# Patient Record
Sex: FEMALE | Race: WHITE | Employment: FULL TIME | URBAN - METROPOLITAN AREA
[De-identification: names, ages, dates, MRNs, and addresses within clinical notes are randomized per-mention and may not be internally consistent; named-entity substitution may affect disease eponyms.]

---

## 2020-09-21 ENCOUNTER — OFFICE VISIT (OUTPATIENT)
Dept: ORTHOPEDICS | Facility: CLINIC | Age: 45
End: 2020-09-21
Payer: COMMERCIAL

## 2020-09-21 VITALS
SYSTOLIC BLOOD PRESSURE: 139 MMHG | WEIGHT: 250 LBS | HEART RATE: 93 BPM | DIASTOLIC BLOOD PRESSURE: 75 MMHG | HEIGHT: 61 IN | OXYGEN SATURATION: 95 % | BODY MASS INDEX: 47.2 KG/M2

## 2020-09-21 DIAGNOSIS — G56.03 BILATERAL CARPAL TUNNEL SYNDROME: Primary | ICD-10-CM

## 2020-09-21 PROCEDURE — 99203 OFFICE O/P NEW LOW 30 MIN: CPT | Mod: 25 | Performed by: ORTHOPAEDIC SURGERY

## 2020-09-21 PROCEDURE — 20526 THER INJECTION CARP TUNNEL: CPT | Mod: 50 | Performed by: ORTHOPAEDIC SURGERY

## 2020-09-21 RX ORDER — MECLIZINE HYDROCHLORIDE 25 MG/1
TABLET ORAL
COMMUNITY
Start: 2020-07-30

## 2020-09-21 RX ORDER — POLYMYXIN B SULFATE AND TRIMETHOPRIM 1; 10000 MG/ML; [USP'U]/ML
1 SOLUTION OPHTHALMIC
COMMUNITY
Start: 2020-09-01

## 2020-09-21 RX ORDER — TRIAMCINOLONE ACETONIDE 40 MG/ML
40 INJECTION, SUSPENSION INTRA-ARTICULAR; INTRAMUSCULAR
Status: SHIPPED | OUTPATIENT
Start: 2020-09-21

## 2020-09-21 RX ORDER — ALBUTEROL SULFATE 90 UG/1
2 POWDER, METERED RESPIRATORY (INHALATION)
COMMUNITY
Start: 2020-03-12

## 2020-09-21 RX ORDER — ALBUTEROL SULFATE 90 UG/1
AEROSOL, METERED RESPIRATORY (INHALATION)
COMMUNITY
Start: 2020-03-12

## 2020-09-21 RX ORDER — PROPRANOLOL HYDROCHLORIDE 10 MG/1
10 TABLET ORAL
COMMUNITY
Start: 2020-07-28

## 2020-09-21 RX ORDER — GABAPENTIN 300 MG/1
CAPSULE ORAL
COMMUNITY
Start: 2020-05-14

## 2020-09-21 RX ORDER — TRIAMCINOLONE ACETONIDE 0.1 %
PASTE (GRAM) DENTAL
COMMUNITY
Start: 2019-07-09

## 2020-09-21 RX ORDER — IBUPROFEN 800 MG/1
800 TABLET, FILM COATED ORAL
COMMUNITY

## 2020-09-21 RX ORDER — CLONAZEPAM 0.5 MG/1
TABLET ORAL
COMMUNITY
Start: 2019-03-05

## 2020-09-21 RX ORDER — LISINOPRIL AND HYDROCHLOROTHIAZIDE 12.5; 2 MG/1; MG/1
1 TABLET ORAL
COMMUNITY
Start: 2020-07-15

## 2020-09-21 RX ORDER — BUPIVACAINE HYDROCHLORIDE 2.5 MG/ML
3 INJECTION, SOLUTION INFILTRATION; PERINEURAL
Status: SHIPPED | OUTPATIENT
Start: 2020-09-21

## 2020-09-21 RX ADMIN — TRIAMCINOLONE ACETONIDE 40 MG: 40 INJECTION, SUSPENSION INTRA-ARTICULAR; INTRAMUSCULAR at 16:57

## 2020-09-21 RX ADMIN — BUPIVACAINE HYDROCHLORIDE 3 ML: 2.5 INJECTION, SOLUTION INFILTRATION; PERINEURAL at 16:57

## 2020-09-21 ASSESSMENT — MIFFLIN-ST. JEOR: SCORE: 1721.37

## 2020-09-21 NOTE — PROGRESS NOTES
CHIEF COMPLAINT:   Chief Complaint   Patient presents with     Right Wrist - Pain     Left Wrist - Pain     Consult     bilateral carpal tunnel syndrome         HISTORY:  Rosalva Oconnor is a 45 year old female , Right -hand dominant who is seen in for Bilateral hand numbness and tingling and pain x 4-5 years. Right maybe a little worse than the left. The symptoms have been constant, and helped early on by night braces but not lately.   Has not tried a NSAIDs, physical therapy and injections.  she has numbness and tingling in all digits.  Other symptoms include pain up arm. Locats pain in thumbs, palm. Reports decreased left forearm rotation (supination/pronation), unrelated, for some reason.     Suspected cause: Due to work activities ()  Pain severity: 4/10  Pain quality: dull and aching  Frequency of symptoms: are constant.  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  Previous modalities tried: a splint  Prior wrist injury/trauma: denies    Usual level of work activity: .    Other PMH:  has no past medical history on file.  There is no problem list on file for this patient.      Surgical Hx:  has no past surgical history on file.    Medications: No current outpatient medications on file.    Allergies: Allergies not on file    Social Hx: .  reports that she has never smoked. She has never used smokeless tobacco. She reports previous alcohol use. She reports that she does not use drugs.    Family Hx: family history is not on file.. Negative for bleeding/clotting disorders. Negative for adverse anesthesia reactions.    REVIEW OF SYSTEMS: 10 point ROS neg other than the symptoms noted above in the HPI and PMH. Notables include  CONSTITUTIONAL:NEGATIVE for fever, chills, change in weight  INTEGUMENTARY/SKIN: NEGATIVE for worrisome rashes, moles or lesions  MUSCULOSKELETAL:See HPI above  Neurology: see HPI above.      EXAM:  /75 (BP Location: Right  "arm, Patient Position: Sitting, Cuff Size: Adult Regular)   Pulse 93   Ht 1.549 m (5' 1\")   Wt 113.4 kg (250 lb)   SpO2 95%   BMI 47.24 kg/m    GENERAL APPEARANCE: healthy, alert and no distress   GAIT: NORMAL  SKIN: no suspicious lesions or rashes  RESPIRATORY: No increased work of breathing.  NEURO:     strength: decreased,    thenar fasiculations: negative    Thenar atrophy:Mild.    Sensation slight decreased in all digits,    reflexes normal in upper extremities.   PSYCH:  mentation appears normal and affect normal, not anxious.    MUSCULOSKELETAL:    RIGHT HAND/FINGERS:    Skin intact. No abnormal skin discoloration, erythema or ecchymosis.   No nail pitting or clubbing.  Normal wear pattern, color and tone.  No observable or palpable masses of the fingers or palm or wrist.  No palpable triggering of fingers.   No observable or palpable cords or nodules of the fingers or palm.    There is no swelling in the wrist, hand, forearm.  There is mild tenderness in the wrist, thumb carpometacarpal joint  There is no ecchymosis.  There is no erythema of the surrounding skin.  There is no maceration of the skin.  There is no gross deformity in the area.  Intact extensors. No extensor lag.    Special tests wrist:    Tinel's positive,    Phalen's positive.    Flexion/compression test positive.   Finkelstein's test: negative.   Ulnar piano sign: negative   Ulnar foveal tenderness:  negative    Special tests medial elbow ulnar nerve:    Tinel's negative,    Flexion/compression test negative.    Special tests median nerve proximal forearm:    Tinel's negative.    1st carpometacarpal grind: mild    Intact sensation light touch median, radial, ulnar nerves of the hand  Intact sensation to the radial and ulnar digital nerves of the fingers, as well as the finger tips.  Intact epl fpl fdp edc wrist flexion/extension biceps/triceps deltoid  Brisk capillary refill to all fingers.   Palpable radial pulse, 2+.      LEFT " HAND/FINGERS:    Skin intact. No abnormal skin discoloration, erythema or ecchymosis.   No nail pitting or clubbing.  Normal wear pattern, color and tone.  No observable or palpable masses of the fingers or palm or wrist.  No palpable triggering of fingers.   No observable or palpable cords or nodules of the fingers or palm.    There is no swelling in the wrist, hand, forearm.  There is mild tenderness in the wrist.  There is no ecchymosis.  There is no erythema of the surrounding skin.  There is no maceration of the skin.  There is no deformity in the area.  Intact extensors. No extensor lag.    Special tests wrist:    Tinel's equivocal,    Phalen's positive.    Flexion/compression test positive.   Finkelstein's test: negative.   Ulnar piano sign: negative   Ulnar foveal tenderness:  negative    Special tests medial elbow ulnar nerve:    Tinel's negative,    Flexion/compression test negative.    Special tests median nerve proximal forearm:    Tinel's negative.    1st carpometacarpal grind: negative    Intact sensation light touch median, radial, ulnar nerves of the hand  Intact sensation to the radial and ulnar digital nerves of the fingers, as well as the finger tips.  Intact epl fpl fdp edc wrist flexion/extension biceps/triceps deltoid  Brisk capillary refill to all fingers.   Palpable radial pulse, 2+.      XRAYS: none indicated.    SPECIAL STUDIES:  EMG:  Bilateral, 7/30/2019, [facility], Dr Doris Calderon, DO  Severe right and moderate left carpal tunnel syndrome. No evidence of ulnar neuropathy or C5-8 radiculopathy bilateral.      ASSESSMENT/PLAN: 44yo RHD female with chronic, bilateral carpal tunnel syndrome.    * discussed with patient signs and symptoms consistent with carpal tunnel syndrome. Carpal tunnel syndrome is compression or pinching of the median nerve at the wrist, as it enters the hand. There are many different causes, and in most cases, multifactorial.    * An indepth discussion was had  with her about the options for treatment, which included activity modification to avoid aggravating activities, taking breaks during activities that cause symptoms, stretching, NSAIDS to help decrease inflammation and swelling within the carpal tunnel, night splinting, corticosteroid injections, and carpal tunnel release.   * depending upon severity and duration of symptoms, nonoperative treatment is usually initiated, starting with least invasive modalities such as activity modification and a trial of night splints and NSAIDs.  * Cortisone injections are considered to decrease swelling and inflammation within the carpal tunnel and compression of the nerve.   * Lastly, carpal tunnel release should symptoms persist despite trial of nonoperative treatment, or in cases of severe carpal tunnel syndrome.  * risks of surgery, including, but not limited to: bleeding, infection, pain, scar, damage to adjacent structures (nerves, vessels, tendons), temporary versus permanent nerve injury, failure to relieve symptoms, recurrence of symptoms, incomplete release, stiffness, scar sensitivity and tenderness, need for further surgery, risks of anesthesia were discussed.  * in some cases, with severe, prolonged symptoms, or in situations of underlying peripheral neuropathy, there may be permanent nerve changes not amenable to surgery, that even with surgery, may not resolve.  * in some cases, it may take 6 months to a year or longer for symptoms to improve or resolve, but even then may not completely resolve.    * at this time, patient elects for injections. See procedure note below  * return to clinic as needed.  * all patient's questions addressed and answered today.          Hand / Upper Extremity Injection/Arthrocentesis: bilateral carpal tunnel    Date/Time: 9/21/2020 4:57 PM  Performed by: Freedom Gonzalez MD  Authorized by: Freedom Gonzalez MD     Indications:  Therapeutic  Needle Size:  22 G  Guidance: landmark     Approach:  Volar  Condition: carpal tunnel    Laterality:  Bilateral    Site:  Bilateral carpal tunnel  Medications (Right):  40 mg triamcinolone 40 MG/ML; 3 mL bupivacaine 0.25 %  Medications (Left):  40 mg triamcinolone 40 MG/ML; 3 mL bupivacaine 0.25 %  Outcome:  Tolerated well, no immediate complications  Procedure discussed: discussed risks, benefits, and alternatives    Consent Given by:  Patient  Timeout: timeout called immediately prior to procedure    Prep: patient was prepped and draped in usual sterile fashion     Only 1 ML of bupivaine injected         Freedom Gonzalez M.D., M.S.  Dept. of Orthopaedic Surgery  [facility]

## 2020-09-21 NOTE — LETTER
9/21/2020         RE: Rosalva Oconnor  [address]  [place] [address]        Dear Colleague,    Thank you for referring your patient, Rosalva Oconnor, to the [facility]. Please see a copy of my visit note below.    CHIEF COMPLAINT:   Chief Complaint   Patient presents with     Right Wrist - Pain     Left Wrist - Pain     Consult     bilateral carpal tunnel syndrome         HISTORY:  Rosalva Oconnor is a 45 year old female , Right -hand dominant who is seen in for Bilateral hand numbness and tingling and pain x 4-5 years. Right maybe a little worse than the left. The symptoms have been constant, and helped early on by night braces but not lately.   Has not tried a NSAIDs, physical therapy and injections.  she has numbness and tingling in all digits.  Other symptoms include pain up arm. Locats pain in thumbs, palm. Reports decreased left forearm rotation (supination/pronation), unrelated, for some reason.     Suspected cause: Due to work activities ()  Pain severity: 4/10  Pain quality: dull and aching  Frequency of symptoms: are constant.  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  Previous modalities tried: a splint  Prior wrist injury/trauma: denies    Usual level of work activity: .    Other PMH:  has no past medical history on file.  There is no problem list on file for this patient.      Surgical Hx:  has no past surgical history on file.    Medications: No current outpatient medications on file.    Allergies: Allergies not on file    Social Hx: .  reports that she has never smoked. She has never used smokeless tobacco. She reports previous alcohol use. She reports that she does not use drugs.    Family Hx: family history is not on file.. Negative for bleeding/clotting disorders. Negative for adverse anesthesia reactions.    REVIEW OF SYSTEMS: 10 point ROS neg other than the symptoms noted above in the HPI  "and PMH. Notables include  CONSTITUTIONAL:NEGATIVE for fever, chills, change in weight  INTEGUMENTARY/SKIN: NEGATIVE for worrisome rashes, moles or lesions  MUSCULOSKELETAL:See HPI above  Neurology: see HPI above.      EXAM:  /75 (BP Location: Right arm, Patient Position: Sitting, Cuff Size: Adult Regular)   Pulse 93   Ht 1.549 m (5' 1\")   Wt 113.4 kg (250 lb)   SpO2 95%   BMI 47.24 kg/m    GENERAL APPEARANCE: healthy, alert and no distress   GAIT: NORMAL  SKIN: no suspicious lesions or rashes  RESPIRATORY: No increased work of breathing.  NEURO:     strength: decreased,    thenar fasiculations: negative    Thenar atrophy:Mild.    Sensation slight decreased in all digits,    reflexes normal in upper extremities.   PSYCH:  mentation appears normal and affect normal, not anxious.    MUSCULOSKELETAL:    RIGHT HAND/FINGERS:    Skin intact. No abnormal skin discoloration, erythema or ecchymosis.   No nail pitting or clubbing.  Normal wear pattern, color and tone.  No observable or palpable masses of the fingers or palm or wrist.  No palpable triggering of fingers.   No observable or palpable cords or nodules of the fingers or palm.    There is no swelling in the wrist, hand, forearm.  There is mild tenderness in the wrist, thumb carpometacarpal joint  There is no ecchymosis.  There is no erythema of the surrounding skin.  There is no maceration of the skin.  There is no gross deformity in the area.  Intact extensors. No extensor lag.    Special tests wrist:    Tinel's positive,    Phalen's positive.    Flexion/compression test positive.   Finkelstein's test: negative.   Ulnar piano sign: negative   Ulnar foveal tenderness:  negative    Special tests medial elbow ulnar nerve:    Tinel's negative,    Flexion/compression test negative.    Special tests median nerve proximal forearm:    Tinel's negative.    1st carpometacarpal grind: mild    Intact sensation light touch median, radial, ulnar nerves of the " hand  Intact sensation to the radial and ulnar digital nerves of the fingers, as well as the finger tips.  Intact epl fpl fdp edc wrist flexion/extension biceps/triceps deltoid  Brisk capillary refill to all fingers.   Palpable radial pulse, 2+.      LEFT HAND/FINGERS:    Skin intact. No abnormal skin discoloration, erythema or ecchymosis.   No nail pitting or clubbing.  Normal wear pattern, color and tone.  No observable or palpable masses of the fingers or palm or wrist.  No palpable triggering of fingers.   No observable or palpable cords or nodules of the fingers or palm.    There is no swelling in the wrist, hand, forearm.  There is mild tenderness in the wrist.  There is no ecchymosis.  There is no erythema of the surrounding skin.  There is no maceration of the skin.  There is no deformity in the area.  Intact extensors. No extensor lag.    Special tests wrist:    Tinel's equivocal,    Phalen's positive.    Flexion/compression test positive.   Finkelstein's test: negative.   Ulnar piano sign: negative   Ulnar foveal tenderness:  negative    Special tests medial elbow ulnar nerve:    Tinel's negative,    Flexion/compression test negative.    Special tests median nerve proximal forearm:    Tinel's negative.    1st carpometacarpal grind: negative    Intact sensation light touch median, radial, ulnar nerves of the hand  Intact sensation to the radial and ulnar digital nerves of the fingers, as well as the finger tips.  Intact epl fpl fdp edc wrist flexion/extension biceps/triceps deltoid  Brisk capillary refill to all fingers.   Palpable radial pulse, 2+.      XRAYS: none indicated.    SPECIAL STUDIES:  EMG:  Bilateral, 7/30/2019, [facility], Dr Doris Calderon, DO  Severe right and moderate left carpal tunnel syndrome. No evidence of ulnar neuropathy or C5-8 radiculopathy bilateral.      ASSESSMENT/PLAN: 44yo RHD female with chronic, bilateral carpal tunnel syndrome.    * discussed with patient signs and  symptoms consistent with carpal tunnel syndrome. Carpal tunnel syndrome is compression or pinching of the median nerve at the wrist, as it enters the hand. There are many different causes, and in most cases, multifactorial.    * An indepth discussion was had with her about the options for treatment, which included activity modification to avoid aggravating activities, taking breaks during activities that cause symptoms, stretching, NSAIDS to help decrease inflammation and swelling within the carpal tunnel, night splinting, corticosteroid injections, and carpal tunnel release.   * depending upon severity and duration of symptoms, nonoperative treatment is usually initiated, starting with least invasive modalities such as activity modification and a trial of night splints and NSAIDs.  * Cortisone injections are considered to decrease swelling and inflammation within the carpal tunnel and compression of the nerve.   * Lastly, carpal tunnel release should symptoms persist despite trial of nonoperative treatment, or in cases of severe carpal tunnel syndrome.  * risks of surgery, including, but not limited to: bleeding, infection, pain, scar, damage to adjacent structures (nerves, vessels, tendons), temporary versus permanent nerve injury, failure to relieve symptoms, recurrence of symptoms, incomplete release, stiffness, scar sensitivity and tenderness, need for further surgery, risks of anesthesia were discussed.  * in some cases, with severe, prolonged symptoms, or in situations of underlying peripheral neuropathy, there may be permanent nerve changes not amenable to surgery, that even with surgery, may not resolve.  * in some cases, it may take 6 months to a year or longer for symptoms to improve or resolve, but even then may not completely resolve.    * at this time, patient elects for injections. See procedure note below  * return to clinic as needed.  * all patient's questions addressed and answered today.           Hand / Upper Extremity Injection/Arthrocentesis: bilateral carpal tunnel    Date/Time: 9/21/2020 4:57 PM  Performed by: Freedom Gonzalez MD  Authorized by: Freedom Gonzalez MD     Indications:  Therapeutic  Needle Size:  22 G  Guidance: landmark    Approach:  Volar  Condition: carpal tunnel    Laterality:  Bilateral    Site:  Bilateral carpal tunnel  Medications (Right):  40 mg triamcinolone 40 MG/ML; 3 mL bupivacaine 0.25 %  Medications (Left):  40 mg triamcinolone 40 MG/ML; 3 mL bupivacaine 0.25 %  Outcome:  Tolerated well, no immediate complications  Procedure discussed: discussed risks, benefits, and alternatives    Consent Given by:  Patient  Timeout: timeout called immediately prior to procedure    Prep: patient was prepped and draped in usual sterile fashion     Only 1 ML of bupivaine injected         Freedom Gonzalez M.D., M.S.  Dept. of Orthopaedic Surgery  [facility]    Again, thank you for allowing me to participate in the care of your patient.        Sincerely,        Freedom Gonzalez MD

## 2020-09-22 ENCOUNTER — TELEPHONE (OUTPATIENT)
Dept: ORTHOPEDICS | Facility: CLINIC | Age: 45
End: 2020-09-22

## 2020-09-22 NOTE — TELEPHONE ENCOUNTER
Reason for call:  Symptom   Symptom or request: she is having issues with the left hand that she had an injection on yesterday. Please call.     Duration (how long have symptoms been present):  1 day    Have you been treated for this before? Yes    Additional comments:  Na     Phone number to reach patient:  Home number on file [phone] (home)    Best Time:  Any     Can we leave a detailed message on this number?  YES    Travel screening: Not Applicable

## 2020-09-23 NOTE — TELEPHONE ENCOUNTER
I left a VM for Rosalva asking that she please call us back to discuss.    Gabino Laguerre PA-C, CAQ (Ortho)  Supervising Physician: Freedom Gonzalez M.D., M.S.  Dept. of Orthopaedic Surgery  [facility]

## 2020-09-23 NOTE — TELEPHONE ENCOUNTER
Called pt and left vm with call back number.    Gail Mackey RN Specialty Triage 9/23/2020 11:43 AM

## 2020-09-24 NOTE — TELEPHONE ENCOUNTER
I left another  for Rosalva. I will try to call her back before I leave for the day.    Gabino Laguerre PA-C, CAQ (Ortho)  Supervising Physician: Freedom Gonzalez M.D., M.S.  Dept. of Orthopaedic Surgery  [facility]

## 2021-02-15 ENCOUNTER — OFFICE VISIT (OUTPATIENT)
Dept: ORTHOPEDICS | Facility: CLINIC | Age: 46
End: 2021-02-15
Payer: COMMERCIAL

## 2021-02-15 VITALS
DIASTOLIC BLOOD PRESSURE: 84 MMHG | BODY MASS INDEX: 47.2 KG/M2 | WEIGHT: 250 LBS | SYSTOLIC BLOOD PRESSURE: 127 MMHG | HEIGHT: 61 IN

## 2021-02-15 DIAGNOSIS — G56.03 BILATERAL CARPAL TUNNEL SYNDROME: Primary | ICD-10-CM

## 2021-02-15 PROBLEM — E66.01 MORBID OBESITY (H): Status: ACTIVE | Noted: 2021-02-15

## 2021-02-15 PROCEDURE — 20526 THER INJECTION CARP TUNNEL: CPT | Mod: RT | Performed by: ORTHOPAEDIC SURGERY

## 2021-02-15 RX ORDER — LIDOCAINE HYDROCHLORIDE 10 MG/ML
1 INJECTION, SOLUTION INFILTRATION; PERINEURAL
Status: SHIPPED | OUTPATIENT
Start: 2021-02-15

## 2021-02-15 RX ORDER — TRIAMCINOLONE ACETONIDE 40 MG/ML
40 INJECTION, SUSPENSION INTRA-ARTICULAR; INTRAMUSCULAR
Status: SHIPPED | OUTPATIENT
Start: 2021-02-15

## 2021-02-15 RX ADMIN — LIDOCAINE HYDROCHLORIDE 1 ML: 10 INJECTION, SOLUTION INFILTRATION; PERINEURAL at 16:18

## 2021-02-15 RX ADMIN — TRIAMCINOLONE ACETONIDE 40 MG: 40 INJECTION, SUSPENSION INTRA-ARTICULAR; INTRAMUSCULAR at 16:18

## 2021-02-15 ASSESSMENT — PAIN SCALES - GENERAL: PAINLEVEL: MODERATE PAIN (4)

## 2021-02-15 ASSESSMENT — MIFFLIN-ST. JEOR: SCORE: 1721.37

## 2021-02-15 NOTE — PROGRESS NOTES
CHIEF COMPLAINT:   Chief Complaint   Patient presents with     Cts     Bilateral cts. last injections on 9/21/20. R>L today. Thinking may only need a right injection today. has been getting worse for the last 3 wks          HISTORY:  Rosalva Oconnor is a 45 year old female , Right -hand dominant who is seen for Bilateral hand numbness and tingling and pain x 4-5 years. Had bilateral carpal tunnel injections 9/21/2020 which worked well until recently. Left side had pain for a day or two after the injection but then was better. Right is worse than the left. The symptoms have been constant, and helped early on by night braces but not lately.  she has numbness and tingling in all digits.  Other symptoms include pain up arm. Locats pain in thumbs, palm. Reports decreased left forearm rotation (supination/pronation), unrelated, for some reason.     Suspected cause: Due to work activities ()  Pain severity: 4/10  Pain quality: dull and aching  Frequency of symptoms: are constant.  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  Previous modalities tried: a splint, injections 9/2020.  Prior wrist injury/trauma: denies    Usual level of work activity: .    Other PMH:  has no past medical history on file.  There is no problem list on file for this patient.      Surgical Hx:  has no past surgical history on file.    Medications:   Current Outpatient Medications:      albuterol (PROAIR HFA/PROVENTIL HFA/VENTOLIN HFA) 108 (90 Base) MCG/ACT inhaler, INHALE 2 PUFFS PO Q 4 HOURS, Disp: , Rfl:      albuterol (PROAIR RESPICLICK) 108 (90 Base) MCG/ACT inhaler, Inhale 2 puffs into the lungs, Disp: , Rfl:      clonazePAM (KLONOPIN) 0.5 MG tablet, Take by mouth: 1 tab 30-60 min prior to flight. May repeat dose in 1 hr., Disp: , Rfl:      FLUoxetine (PROZAC) 20 MG capsule, Take 20 mg by mouth, Disp: , Rfl:      gabapentin (NEURONTIN) 300 MG capsule, TK 1 C PO QD, Disp: , Rfl:      ibuprofen  (ADVIL/MOTRIN) 800 MG tablet, Take 800 mg by mouth, Disp: , Rfl:      levonorgestrel (MIRENA) 20 MCG/24HR IUD, 1 each by Intrauterine route once, Disp: , Rfl:      lisinopril-hydrochlorothiazide (ZESTORETIC) 20-12.5 MG tablet, Take 1 tablet by mouth, Disp: , Rfl:      meclizine (ANTIVERT) 25 MG tablet, TAKE 1 TO 2 TABLETS BY MOUTH THREE TIMES DAILY AS NEEDED FOR DIZZINESS, Disp: , Rfl:      miconazole (MICATIN) 2 % external powder, , Disp: , Rfl:      omeprazole (PRILOSEC) 20 MG DR capsule, Take 20 mg by mouth, Disp: , Rfl:      propranolol (INDERAL) 10 MG tablet, Take 10 mg by mouth, Disp: , Rfl:      triamcinolone (KENALOG) 0.1 % paste, Apply small amount to affected area in mouth 2 times a day., Disp: , Rfl:      trimethoprim-polymyxin b (POLYTRIM) 88027-5.1 UNIT/ML-% ophthalmic solution, Apply 1 drop to eye, Disp: , Rfl:     Current Facility-Administered Medications:      bupivacaine (MARCAINE) 0.25 % injection 3 mL, 3 mL, , , Freedom Gonzalez MD, 3 mL at 09/21/20 1657     bupivacaine (MARCAINE) 0.25 % injection 3 mL, 3 mL, , , Freedom Gonzalez MD, 3 mL at 09/21/20 1657     triamcinolone (KENALOG-40) injection 40 mg, 40 mg, , , Fredeom Gonzalez MD, 40 mg at 09/21/20 1657     triamcinolone (KENALOG-40) injection 40 mg, 40 mg, , , Freedom Gonzalez MD, 40 mg at 09/21/20 1657    Allergies:   Allergies   Allergen Reactions     Ampicillin Swelling and Other (See Comments)     Leg cramps - can take other PCNs  Leg cramps       Nitrofurantoin Nausea and Vomiting     (macrobid)       Prochlorperazine Other (See Comments)     dystonic  dystonic reaction/early pregnancy, October 06.       Sulfa Drugs Rash     And vomiting       Sumatriptan Other (See Comments) and Rash     Tingling head         Social Hx: .  reports that she has never smoked. She has never used smokeless tobacco. She reports previous alcohol use. She reports that she does not use drugs.    Family Hx: family history is not on  "file.    REVIEW OF SYSTEMS:   CONSTITUTIONAL:NEGATIVE for fever, chills, change in weight  INTEGUMENTARY/SKIN: NEGATIVE for worrisome rashes, moles or lesions  MUSCULOSKELETAL:See HPI above  Neurology: see HPI above.      EXAM:  /84   Ht 1.549 m (5' 1\")   Wt 113.4 kg (250 lb)   BMI 47.24 kg/m    GENERAL APPEARANCE: healthy, alert and no distress   GAIT: NORMAL  SKIN: no suspicious lesions or rashes  RESPIRATORY: No increased work of breathing.  NEURO:     strength: decreased,    thenar fasiculations: negative    Thenar atrophy:Mild.    Sensation slight decreased in all digits,    reflexes normal in upper extremities.   PSYCH:  mentation appears normal and affect normal, not anxious.    MUSCULOSKELETAL:    RIGHT HAND/FINGERS:    Skin intact. No abnormal skin discoloration, erythema or ecchymosis.   No nail pitting or clubbing.  Normal wear pattern, color and tone.    There is no swelling in the wrist, hand, forearm.  There is mild tenderness in the wrist, thumb carpometacarpal joint  There is no ecchymosis.  There is no erythema of the surrounding skin.  There is no maceration of the skin.  There is no gross deformity in the area.  Intact extensors. No extensor lag.    Special tests wrist:    Tinel's positive,    Phalen's positive.    Flexion/compression test positive.   Finkelstein's test: negative.   Ulnar piano sign: negative   Ulnar foveal tenderness:  negative    Special tests medial elbow ulnar nerve:    Tinel's negative,    Flexion/compression test negative.    Special tests median nerve proximal forearm:    Tinel's negative.    1st carpometacarpal grind: mild    Intact sensation light touch median, radial, ulnar nerves of the hand  Intact sensation to the radial and ulnar digital nerves of the fingers, as well as the finger tips.  Intact epl fpl fdp edc wrist flexion/extension biceps/triceps deltoid  Brisk capillary refill to all fingers.   Palpable radial pulse, 2+.      LEFT HAND/FINGERS:  "   Skin intact. No abnormal skin discoloration, erythema or ecchymosis.   No nail pitting or clubbing.  Normal wear pattern, color and tone.    There is no swelling in the wrist, hand, forearm.  There is mild tenderness in the wrist.  There is no ecchymosis.  There is no erythema of the surrounding skin.  There is no maceration of the skin.  There is no deformity in the area.  Intact extensors. No extensor lag.    Special tests wrist:    Tinel's equivocal,    Phalen's positive.    Flexion/compression test positive.   Finkelstein's test: negative.   Ulnar piano sign: negative   Ulnar foveal tenderness:  negative    Special tests medial elbow ulnar nerve:    Tinel's negative,    Flexion/compression test negative.    Special tests median nerve proximal forearm:    Tinel's negative.    1st carpometacarpal grind: negative    Intact sensation light touch median, radial, ulnar nerves of the hand  Intact sensation to the radial and ulnar digital nerves of the fingers, as well as the finger tips.  Intact epl fpl fdp edc wrist flexion/extension biceps/triceps deltoid  Brisk capillary refill to all fingers.   Palpable radial pulse, 2+.      XRAYS: none indicated.    SPECIAL STUDIES:  EMG:  Bilateral, 7/30/2019, [facility], Dr Doris Calderon, DO  Severe right and moderate left carpal tunnel syndrome. No evidence of ulnar neuropathy or C5-8 radiculopathy bilateral.      ASSESSMENT/PLAN: 44yo RHD female with chronic, bilateral carpal tunnel syndrome.    * glad to hear injections work, unfortunately not long lasting. Given severity noted on EMG, not sure repeat injections would do any better, and would probably recommend surgical release as a more longterm solution.    * risks of surgery, including, but not limited to: bleeding, infection, pain, scar, damage to adjacent structures (nerves, vessels, tendons), temporary versus permanent nerve injury, failure to relieve symptoms, recurrence of symptoms, incomplete release,  stiffness, scar sensitivity and tenderness, need for further surgery, risks of anesthesia were discussed.  * in some cases, with severe, prolonged symptoms, or in situations of underlying peripheral neuropathy, there may be permanent nerve changes not amenable to surgery, that even with surgery, may not resolve.  * in some cases, it may take 6 months to a year or longer for symptoms to improve or resolve, but even then may not completely resolve.    * at this time, patient elects for injection right wrist only. See procedure note below  * return to clinic as needed.  * all patient's questions addressed and answered today.          Hand / Upper Extremity Injection/Arthrocentesis: R carpal tunnel    Date/Time: 2/15/2021 4:18 PM  Performed by: Gabino Laguerre PA  Authorized by: Freedom Gonzalez MD     Indications:  Pain  Needle Size:  25 G  Guidance: landmark    Approach:  Volar  Condition: carpal tunnel      Site:  R carpal tunnel  Medications:  1 mL lidocaine 1 %; 40 mg triamcinolone 40 MG/ML  Outcome:  Tolerated well, no immediate complications  Procedure discussed: discussed risks, benefits, and alternatives    Consent Given by:  Patient  Prep: patient was prepped and draped in usual sterile fashion          Freedom Gonzalez M.D., M.S.  Dept. of Orthopaedic Surgery  [facility]

## 2021-02-15 NOTE — LETTER
2/15/2021         RE: Rosalva Oconnor  [address]  [place] [address]        Dear Colleague,    Thank you for referring your patient, Rosalva Oconnor, to the [facility] [place]. Please see a copy of my visit note below.    CHIEF COMPLAINT:   Chief Complaint   Patient presents with     Cts     Bilateral cts. last injections on 9/21/20. R>L today. Thinking may only need a right injection today. has been getting worse for the last 3 wks          HISTORY:  Rosalva Oconnor is a 45 year old female , Right -hand dominant who is seen for Bilateral hand numbness and tingling and pain x 4-5 years. Had bilateral carpal tunnel injections 9/21/2020 which worked well until recently. Left side had pain for a day or two after the injection but then was better. Right is worse than the left. The symptoms have been constant, and helped early on by night braces but not lately.  she has numbness and tingling in all digits.  Other symptoms include pain up arm. Locats pain in thumbs, palm. Reports decreased left forearm rotation (supination/pronation), unrelated, for some reason.     Suspected cause: Due to work activities ()  Pain severity: 4/10  Pain quality: dull and aching  Frequency of symptoms: are constant.  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  Previous modalities tried: a splint, injections 9/2020.  Prior wrist injury/trauma: denies    Usual level of work activity: .    Other PMH:  has no past medical history on file.  There is no problem list on file for this patient.      Surgical Hx:  has no past surgical history on file.    Medications:   Current Outpatient Medications:      albuterol (PROAIR HFA/PROVENTIL HFA/VENTOLIN HFA) 108 (90 Base) MCG/ACT inhaler, INHALE 2 PUFFS PO Q 4 HOURS, Disp: , Rfl:      albuterol (PROAIR RESPICLICK) 108 (90 Base) MCG/ACT inhaler, Inhale 2 puffs into the lungs, Disp: , Rfl:      clonazePAM (KLONOPIN) 0.5 MG  tablet, Take by mouth: 1 tab 30-60 min prior to flight. May repeat dose in 1 hr., Disp: , Rfl:      FLUoxetine (PROZAC) 20 MG capsule, Take 20 mg by mouth, Disp: , Rfl:      gabapentin (NEURONTIN) 300 MG capsule, TK 1 C PO QD, Disp: , Rfl:      ibuprofen (ADVIL/MOTRIN) 800 MG tablet, Take 800 mg by mouth, Disp: , Rfl:      levonorgestrel (MIRENA) 20 MCG/24HR IUD, 1 each by Intrauterine route once, Disp: , Rfl:      lisinopril-hydrochlorothiazide (ZESTORETIC) 20-12.5 MG tablet, Take 1 tablet by mouth, Disp: , Rfl:      meclizine (ANTIVERT) 25 MG tablet, TAKE 1 TO 2 TABLETS BY MOUTH THREE TIMES DAILY AS NEEDED FOR DIZZINESS, Disp: , Rfl:      miconazole (MICATIN) 2 % external powder, , Disp: , Rfl:      omeprazole (PRILOSEC) 20 MG DR capsule, Take 20 mg by mouth, Disp: , Rfl:      propranolol (INDERAL) 10 MG tablet, Take 10 mg by mouth, Disp: , Rfl:      triamcinolone (KENALOG) 0.1 % paste, Apply small amount to affected area in mouth 2 times a day., Disp: , Rfl:      trimethoprim-polymyxin b (POLYTRIM) 66725-1.1 UNIT/ML-% ophthalmic solution, Apply 1 drop to eye, Disp: , Rfl:     Current Facility-Administered Medications:      bupivacaine (MARCAINE) 0.25 % injection 3 mL, 3 mL, , , Freedom Gonzalez MD, 3 mL at 09/21/20 1657     bupivacaine (MARCAINE) 0.25 % injection 3 mL, 3 mL, , , Freedom Gonzalez MD, 3 mL at 09/21/20 1657     triamcinolone (KENALOG-40) injection 40 mg, 40 mg, , , Freedom Gonzalez MD, 40 mg at 09/21/20 1657     triamcinolone (KENALOG-40) injection 40 mg, 40 mg, , , Freedom Gonzalez MD, 40 mg at 09/21/20 1657    Allergies:   Allergies   Allergen Reactions     Ampicillin Swelling and Other (See Comments)     Leg cramps - can take other PCNs  Leg cramps       Nitrofurantoin Nausea and Vomiting     (macrobid)       Prochlorperazine Other (See Comments)     dystonic  dystonic reaction/early pregnancy, October 06.       Sulfa Drugs Rash     And vomiting       Sumatriptan Other (See Comments) and  "Rash     Tingling head         Social Hx: .  reports that she has never smoked. She has never used smokeless tobacco. She reports previous alcohol use. She reports that she does not use drugs.    Family Hx: family history is not on file.    REVIEW OF SYSTEMS:   CONSTITUTIONAL:NEGATIVE for fever, chills, change in weight  INTEGUMENTARY/SKIN: NEGATIVE for worrisome rashes, moles or lesions  MUSCULOSKELETAL:See HPI above  Neurology: see HPI above.      EXAM:  /84   Ht 1.549 m (5' 1\")   Wt 113.4 kg (250 lb)   BMI 47.24 kg/m    GENERAL APPEARANCE: healthy, alert and no distress   GAIT: NORMAL  SKIN: no suspicious lesions or rashes  RESPIRATORY: No increased work of breathing.  NEURO:     strength: decreased,    thenar fasiculations: negative    Thenar atrophy:Mild.    Sensation slight decreased in all digits,    reflexes normal in upper extremities.   PSYCH:  mentation appears normal and affect normal, not anxious.    MUSCULOSKELETAL:    RIGHT HAND/FINGERS:    Skin intact. No abnormal skin discoloration, erythema or ecchymosis.   No nail pitting or clubbing.  Normal wear pattern, color and tone.    There is no swelling in the wrist, hand, forearm.  There is mild tenderness in the wrist, thumb carpometacarpal joint  There is no ecchymosis.  There is no erythema of the surrounding skin.  There is no maceration of the skin.  There is no gross deformity in the area.  Intact extensors. No extensor lag.    Special tests wrist:    Tinel's positive,    Phalen's positive.    Flexion/compression test positive.   Finkelstein's test: negative.   Ulnar piano sign: negative   Ulnar foveal tenderness:  negative    Special tests medial elbow ulnar nerve:    Tinel's negative,    Flexion/compression test negative.    Special tests median nerve proximal forearm:    Tinel's negative.    1st carpometacarpal grind: mild    Intact sensation light touch median, radial, ulnar nerves of the hand  Intact sensation to the " radial and ulnar digital nerves of the fingers, as well as the finger tips.  Intact epl fpl fdp edc wrist flexion/extension biceps/triceps deltoid  Brisk capillary refill to all fingers.   Palpable radial pulse, 2+.      LEFT HAND/FINGERS:    Skin intact. No abnormal skin discoloration, erythema or ecchymosis.   No nail pitting or clubbing.  Normal wear pattern, color and tone.    There is no swelling in the wrist, hand, forearm.  There is mild tenderness in the wrist.  There is no ecchymosis.  There is no erythema of the surrounding skin.  There is no maceration of the skin.  There is no deformity in the area.  Intact extensors. No extensor lag.    Special tests wrist:    Tinel's equivocal,    Phalen's positive.    Flexion/compression test positive.   Finkelstein's test: negative.   Ulnar piano sign: negative   Ulnar foveal tenderness:  negative    Special tests medial elbow ulnar nerve:    Tinel's negative,    Flexion/compression test negative.    Special tests median nerve proximal forearm:    Tinel's negative.    1st carpometacarpal grind: negative    Intact sensation light touch median, radial, ulnar nerves of the hand  Intact sensation to the radial and ulnar digital nerves of the fingers, as well as the finger tips.  Intact epl fpl fdp edc wrist flexion/extension biceps/triceps deltoid  Brisk capillary refill to all fingers.   Palpable radial pulse, 2+.      XRAYS: none indicated.    SPECIAL STUDIES:  EMG:  Bilateral, 7/30/2019, [facility], Dr Doris Calderon, DO  Severe right and moderate left carpal tunnel syndrome. No evidence of ulnar neuropathy or C5-8 radiculopathy bilateral.      ASSESSMENT/PLAN: 44yo RHD female with chronic, bilateral carpal tunnel syndrome.    * glad to hear injections work, unfortunately not long lasting. Given severity noted on EMG, not sure repeat injections would do any better, and would probably recommend surgical release as a more longterm solution.    * risks of surgery,  including, but not limited to: bleeding, infection, pain, scar, damage to adjacent structures (nerves, vessels, tendons), temporary versus permanent nerve injury, failure to relieve symptoms, recurrence of symptoms, incomplete release, stiffness, scar sensitivity and tenderness, need for further surgery, risks of anesthesia were discussed.  * in some cases, with severe, prolonged symptoms, or in situations of underlying peripheral neuropathy, there may be permanent nerve changes not amenable to surgery, that even with surgery, may not resolve.  * in some cases, it may take 6 months to a year or longer for symptoms to improve or resolve, but even then may not completely resolve.    * at this time, patient elects for injection right wrist only. See procedure note below  * return to clinic as needed.  * all patient's questions addressed and answered today.          Hand / Upper Extremity Injection/Arthrocentesis: R carpal tunnel    Date/Time: 2/15/2021 4:18 PM  Performed by: Gabino Laguerre PA  Authorized by: Freedom Gonzalez MD     Indications:  Pain  Needle Size:  25 G  Guidance: landmark    Approach:  Volar  Condition: carpal tunnel      Site:  R carpal tunnel  Medications:  1 mL lidocaine 1 %; 40 mg triamcinolone 40 MG/ML  Outcome:  Tolerated well, no immediate complications  Procedure discussed: discussed risks, benefits, and alternatives    Consent Given by:  Patient  Prep: patient was prepped and draped in usual sterile fashion          Freedom Gonzalez M.D., M.S.  Dept. of Orthopaedic Surgery  [facility]      Again, thank you for allowing me to participate in the care of your patient.        Sincerely,        Freedom Gonzalez MD

## 2023-06-06 ENCOUNTER — TRANSFERRED RECORDS (OUTPATIENT)
Dept: HEALTH INFORMATION MANAGEMENT | Facility: CLINIC | Age: 48
End: 2023-06-06

## 2023-06-09 ENCOUNTER — TELEPHONE (OUTPATIENT)
Dept: DERMATOLOGY | Facility: CLINIC | Age: 48
End: 2023-06-09

## 2023-06-09 NOTE — CONFIDENTIAL NOTE
Referral received for vascular malformation from Dr. Terrell Mcgrath. Dr. Villasenor review and requested we obtain a better history.     Spoke with parent/patient to obtain history. Photos requested via e-mail.    1. 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 One on the R forearm, one on L 5th digit, and possibly a new one on the R wrist.    2. First noticed: Present for many years. Never diagnosed with anything.    3. Any other birthmarks: No other birth marks but has developed small red pinpoint marks; mainly on her torso. Most likely cherry angiomas. There is a larger raised red lesion on her L abdomen with a few smaller raised ones surrounding it.     4. Does anyone else in the family have any birthmarks:     5. Pain: When bumped. Only lasts for a few moments    6. Pain management:     7. Compression: no    8. Swelling: no    9. Changing: no    10. Procedures- facility/time frame/benefit/type: no    11. Imaging- facility/time frame/type: no    12. Medications: no    13. Is there anything else you would like us to know:

## 2023-06-09 NOTE — TELEPHONE ENCOUNTER
J - this is small and localized. Let's get an US and have Sandee see.   She might benefit from Jaison NORIEGA

## 2023-06-16 DIAGNOSIS — Q27.9 VASCULAR MALFORMATION: Primary | ICD-10-CM

## 2023-06-20 NOTE — CONFIDENTIAL NOTE
Dr. Dunham reviewed and recommended pt have an US completed and be seen in regular clinic. Pt accepted appt for 8/30/23. Phone number provided for scheduling the US.    Yanna Nascimento, [facility]